# Patient Record
Sex: FEMALE | Race: WHITE | NOT HISPANIC OR LATINO | Employment: UNEMPLOYED | URBAN - NONMETROPOLITAN AREA
[De-identification: names, ages, dates, MRNs, and addresses within clinical notes are randomized per-mention and may not be internally consistent; named-entity substitution may affect disease eponyms.]

---

## 2024-09-05 ENCOUNTER — TELEPHONE (OUTPATIENT)
Dept: NEUROSURGERY | Facility: CLINIC | Age: 24
End: 2024-09-05
Payer: OTHER GOVERNMENT

## 2024-09-05 NOTE — TELEPHONE ENCOUNTER
Moon patient's mom called me to check on patients referral we received late yesterday 9/4/24 I told her the referral was sent to Dr. Simmons for review and as soon as he responds I will call her back with information she voiced understanding.